# Patient Record
Sex: MALE | Race: WHITE | NOT HISPANIC OR LATINO | URBAN - METROPOLITAN AREA
[De-identification: names, ages, dates, MRNs, and addresses within clinical notes are randomized per-mention and may not be internally consistent; named-entity substitution may affect disease eponyms.]

---

## 2024-11-21 ENCOUNTER — APPOINTMENT (RX ONLY)
Dept: URBAN - METROPOLITAN AREA CLINIC 41 | Facility: CLINIC | Age: 34
Setting detail: DERMATOLOGY
End: 2024-11-21

## 2024-11-21 DIAGNOSIS — L30.9 DERMATITIS, UNSPECIFIED: ICD-10-CM | Status: INADEQUATELY CONTROLLED

## 2024-11-21 PROCEDURE — ? PRESCRIPTION

## 2024-11-21 PROCEDURE — ? COUNSELING

## 2024-11-21 PROCEDURE — ? PRESCRIPTION MEDICATION MANAGEMENT

## 2024-11-21 PROCEDURE — 99204 OFFICE O/P NEW MOD 45 MIN: CPT

## 2024-11-21 RX ORDER — PIMECROLIMUS 10 MG/G
CREAM TOPICAL
Qty: 30 | Refills: 3 | Status: ERX | COMMUNITY
Start: 2024-11-21

## 2024-11-21 RX ADMIN — PIMECROLIMUS: 10 CREAM TOPICAL at 00:00

## 2024-11-21 ASSESSMENT — LOCATION SIMPLE DESCRIPTION DERM: LOCATION SIMPLE: INFERIOR FOREHEAD

## 2024-11-21 ASSESSMENT — LOCATION DETAILED DESCRIPTION DERM: LOCATION DETAILED: INFERIOR MID FOREHEAD

## 2024-11-21 ASSESSMENT — LOCATION ZONE DERM: LOCATION ZONE: FACE

## 2024-11-21 NOTE — PROCEDURE: PRESCRIPTION MEDICATION MANAGEMENT
Initiate Treatment: pimecrolimus 1 % topical cream
Detail Level: Zone
Plan: Rto to consider bx when rash is more flared
Render In Strict Bullet Format?: No

## 2024-11-21 NOTE — PROCEDURE: COUNSELING
Patient Specific Counseling (Will Not Stick From Patient To Patient): -\\nPt notes family hx of lupus \\nPt mentioned rash to pcp and had BW done, BW shows a positive BETH \\nPt notes he has not noticed any flaring with sun exposure, counseled pt to monitor as lupus can lead to flaring with sun \\nCounseled that we would do a punch bx when rash is very flared however today rash is not very flared which can make it harder to dx \\nPt will fu with rheumatology and consider bx when more flared
Detail Level: Detailed

## 2024-11-21 NOTE — HPI: PROCEDURE (SKIN BIOPSY)
How Severe Is Your Condition?: mild
Body Location Override (Optional): Face
Additional History: -\\n\\nNew pt here for skin biopsy for lupus

## 2025-04-24 ENCOUNTER — APPOINTMENT (OUTPATIENT)
Dept: URBAN - METROPOLITAN AREA CLINIC 41 | Facility: CLINIC | Age: 35
Setting detail: DERMATOLOGY
End: 2025-04-24

## 2025-04-24 DIAGNOSIS — L85.3 XEROSIS CUTIS: ICD-10-CM | Status: STABLE

## 2025-04-24 DIAGNOSIS — D18.0 HEMANGIOMA: ICD-10-CM | Status: STABLE

## 2025-04-24 DIAGNOSIS — D22 MELANOCYTIC NEVI: ICD-10-CM | Status: STABLE

## 2025-04-24 DIAGNOSIS — L57.8 OTHER SKIN CHANGES DUE TO CHRONIC EXPOSURE TO NONIONIZING RADIATION: ICD-10-CM | Status: STABLE

## 2025-04-24 PROBLEM — D18.01 HEMANGIOMA OF SKIN AND SUBCUTANEOUS TISSUE: Status: ACTIVE | Noted: 2025-04-24

## 2025-04-24 PROBLEM — D22.72 MELANOCYTIC NEVI OF LEFT LOWER LIMB, INCLUDING HIP: Status: ACTIVE | Noted: 2025-04-24

## 2025-04-24 PROBLEM — D22.5 MELANOCYTIC NEVI OF TRUNK: Status: ACTIVE | Noted: 2025-04-24

## 2025-04-24 PROBLEM — D48.5 NEOPLASM OF UNCERTAIN BEHAVIOR OF SKIN: Status: ACTIVE | Noted: 2025-04-24

## 2025-04-24 PROCEDURE — 11300 SHAVE SKIN LESION 0.5 CM/<: CPT

## 2025-04-24 PROCEDURE — ? ADDITIONAL NOTES

## 2025-04-24 PROCEDURE — 99213 OFFICE O/P EST LOW 20 MIN: CPT | Mod: 25

## 2025-04-24 PROCEDURE — ? SHAVE REMOVAL

## 2025-04-24 PROCEDURE — ? FULL BODY SKIN EXAM

## 2025-04-24 PROCEDURE — ? COUNSELING

## 2025-04-24 PROCEDURE — 11301 SHAVE SKIN LESION 0.6-1.0 CM: CPT

## 2025-04-24 PROCEDURE — ? TREATMENT REGIMEN

## 2025-04-24 ASSESSMENT — LOCATION DETAILED DESCRIPTION DERM
LOCATION DETAILED: RIGHT PROXIMAL PRETIBIAL REGION
LOCATION DETAILED: LEFT MID-UPPER BACK
LOCATION DETAILED: INFERIOR THORACIC SPINE
LOCATION DETAILED: LEFT DISTAL PRETIBIAL REGION
LOCATION DETAILED: INFERIOR MID FOREHEAD
LOCATION DETAILED: LEFT SUPERIOR MEDIAL UPPER BACK
LOCATION DETAILED: LEFT POSTERIOR SHOULDER
LOCATION DETAILED: LEFT INFERIOR MEDIAL MIDBACK
LOCATION DETAILED: EPIGASTRIC SKIN
LOCATION DETAILED: RIGHT POSTERIOR SHOULDER
LOCATION DETAILED: LEFT ANTERIOR PROXIMAL THIGH

## 2025-04-24 ASSESSMENT — LOCATION ZONE DERM
LOCATION ZONE: ARM
LOCATION ZONE: TRUNK
LOCATION ZONE: LEG
LOCATION ZONE: FACE

## 2025-04-24 ASSESSMENT — LOCATION SIMPLE DESCRIPTION DERM
LOCATION SIMPLE: ABDOMEN
LOCATION SIMPLE: LEFT SHOULDER
LOCATION SIMPLE: INFERIOR FOREHEAD
LOCATION SIMPLE: LEFT PRETIBIAL REGION
LOCATION SIMPLE: LEFT UPPER BACK
LOCATION SIMPLE: RIGHT PRETIBIAL REGION
LOCATION SIMPLE: LEFT LOWER BACK
LOCATION SIMPLE: LEFT THIGH
LOCATION SIMPLE: RIGHT SHOULDER
LOCATION SIMPLE: UPPER BACK

## 2025-04-24 NOTE — PROCEDURE: SHAVE REMOVAL
Consent was obtained from the patient. The risks and benefits to therapy were discussed in detail. Specifically, the risks of infection, scarring, bleeding, prolonged wound healing, incomplete removal, allergy to anesthesia, nerve injury and recurrence were addressed. Prior to the procedure, the treatment site was clearly identified and confirmed by the patient. All components of Universal Protocol/PAUSE Rule completed.
Post-Care Instructions: I reviewed with the patient in detail post-care instructions. Patient is to keep the biopsy site dry overnight, and then apply bacitracin twice daily until healed. Patient may apply hydrogen peroxide soaks to remove any crusting.
Medical Necessity Information: It is in your best interest to select a reason for this procedure from the list below. All of these items fulfill various CMS LCD requirements except the new and changing color options.
Size Of Margin In Cm (Margins Are Not Added To Billing Dimensions): 0.1
Render Path Notes In Note?: No
Medical Necessity Clause: This procedure was medically necessary because the lesion that was treated was:
Anesthesia Volume In Cc: 0.5
Depth Of Shave: dermis
Biopsy Method: 15 blade
Detail Level: Detailed
Was A Bandage Applied: Yes
X Size Of Lesion In Cm (Optional): 0
Notification Instructions: Patient will be notified of pathology results. However, patient instructed to call the office if not contacted within 2 weeks.
Size Of Lesion In Cm (Required): 0.4
Hemostasis: Aluminum Chloride
Billing Type: Third-Party Bill
Wound Care: Vaseline
Anesthesia Type: 1% lidocaine with epinephrine
Lab: 6
Path Notes (To The Dermatopathologist): no prev path
Lab Facility: [id number]
Size Of Lesion In Cm (Required): 0.8

## 2025-04-24 NOTE — HPI: EVALUATION OF SKIN LESION(S)
Hpi Title: Evaluation of Skin Lesions
Additional History: Est pt here for evaluation of skin lesions, last one was 6 years ago. No hx. Two spots of concern on the back and one on the L thigh.

## 2025-04-24 NOTE — PROCEDURE: COUNSELING
Detail Level: Generalized
Detail Level: Zone
Detail Level: Detailed
Patient Specific Counseling (Will Not Stick From Patient To Patient): -\\nCounseled pt that if pt would like this growth removed, pt should get it surgically excised due to its large base. Pt can schedule with Dr. Shi if desired, pt would like to defer at this point.